# Patient Record
Sex: FEMALE | Race: WHITE | HISPANIC OR LATINO | URBAN - METROPOLITAN AREA
[De-identification: names, ages, dates, MRNs, and addresses within clinical notes are randomized per-mention and may not be internally consistent; named-entity substitution may affect disease eponyms.]

---
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
## 2021-05-18 ENCOUNTER — APPOINTMENT (OUTPATIENT)
Dept: FAMILY MEDICINE | Facility: CLINIC | Age: 21
End: 2021-05-18
Payer: COMMERCIAL

## 2021-05-18 VITALS
HEIGHT: 62 IN | WEIGHT: 150 LBS | BODY MASS INDEX: 27.6 KG/M2 | HEART RATE: 80 BPM | SYSTOLIC BLOOD PRESSURE: 118 MMHG | DIASTOLIC BLOOD PRESSURE: 70 MMHG | TEMPERATURE: 98.7 F | OXYGEN SATURATION: 99 %

## 2021-05-18 DIAGNOSIS — Z78.9 OTHER SPECIFIED HEALTH STATUS: ICD-10-CM

## 2021-05-18 DIAGNOSIS — Z13.21 ENCOUNTER FOR SCREENING FOR NUTRITIONAL DISORDER: ICD-10-CM

## 2021-05-18 DIAGNOSIS — Z23 ENCOUNTER FOR IMMUNIZATION: ICD-10-CM

## 2021-05-18 DIAGNOSIS — Z13.220 ENCOUNTER FOR SCREENING FOR LIPOID DISORDERS: ICD-10-CM

## 2021-05-18 DIAGNOSIS — Z13.1 ENCOUNTER FOR SCREENING FOR DIABETES MELLITUS: ICD-10-CM

## 2021-05-18 DIAGNOSIS — Z00.00 ENCOUNTER FOR GENERAL ADULT MEDICAL EXAMINATION W/OUT ABNORMAL FINDINGS: ICD-10-CM

## 2021-05-18 DIAGNOSIS — Z13.29 ENCOUNTER FOR SCREENING FOR OTHER SUSPECTED ENDOCRINE DISORDER: ICD-10-CM

## 2021-05-18 PROCEDURE — 99385 PREV VISIT NEW AGE 18-39: CPT | Mod: 25

## 2021-05-18 PROCEDURE — G0444 DEPRESSION SCREEN ANNUAL: CPT

## 2021-05-18 PROCEDURE — G0442 ANNUAL ALCOHOL SCREEN 15 MIN: CPT

## 2021-05-18 NOTE — ASSESSMENT
[FreeTextEntry1] : Annual physical: f/u routine labwork\par BMI 27: recommend low fat diet, wt loss, exercise, f/u lipid panel\par Rectal bleeding: intermittent for 3 months, no petar obvious bleeding at this time, no lumps/masses on SERA, FOBT positive, f/u labwork including CBC/iron studies, refer to colorectal sx\par RTC 1 wk

## 2021-05-18 NOTE — PHYSICAL EXAM
[Stool Occult Blood] : stool positive for occult blood [Normal] : affect was normal and insight and judgment were intact [FreeTextEntry1] : no lumps, masses noted with SERA

## 2021-05-18 NOTE — HISTORY OF PRESENT ILLNESS
[FreeTextEntry1] : NP/CPE\par  [de-identified] : 22 yo female presents for annual physical. Has complaint of intermittent rectal bleeding. Pt says she first had a bloody bowel movement in February after which she went to [facility] ER. She was discharged and told to follow up with GI. She did not follow up with GI since she did not have insurance at that time. Denies fever, chills, cp, palpitations, sob, nv, heat/cold intolerance, dizziness, muscle weakness, loss of sensation, bowel/bladder incontinence or calf pain.\par

## 2021-05-18 NOTE — REVIEW OF SYSTEMS
[Abdominal Pain] : no abdominal pain [Nausea] : no nausea [Constipation] : no constipation [Diarrhea] : diarrhea [Heartburn] : no heartburn [Vomiting] : no vomiting [Negative] : Psychiatric [FreeTextEntry7] : bright red blood per rectum

## 2021-05-18 NOTE — HEALTH RISK ASSESSMENT
[Good] : ~his/her~  mood as  good [] : No [Yes] : Yes [Monthly or less (1 pt)] : Monthly or less (1 point) [1 or 2 (0 pts)] : 1 or 2 (0 points) [Never (0 pts)] : Never (0 points) [No] : In the past 12 months have you used drugs other than those required for medical reasons? No [No falls in past year] : Patient reported no falls in the past year [0] : 2) Feeling down, depressed, or hopeless: Not at all (0) [Audit-CScore] : 1 [de-identified] : needs improvement [de-identified] : needs improvement [[id number]] : 0 [HIV test declined] : HIV test declined [Hepatitis C test declined] : Hepatitis C test declined [With Family] : lives with family [Employed] : employed [Significant Other] : lives with significant other [Sexually Active] : sexually active [High Risk Behavior] : no high risk behavior [Feels Safe at Home] : Feels safe at home [Fully functional (bathing, dressing, toileting, transferring, walking, feeding)] : Fully functional (bathing, dressing, toileting, transferring, walking, feeding) [Fully functional (using the telephone, shopping, preparing meals, housekeeping, doing laundry, using] : Fully functional and needs no help or supervision to perform IADLs (using the telephone, shopping, preparing meals, housekeeping, doing laundry, using transportation, managing medications and managing finances) [Reports changes in hearing] : Reports no changes in hearing [Reports changes in vision] : Reports no changes in vision [Reports changes in dental health] : Reports no changes in dental health

## 2021-05-21 ENCOUNTER — APPOINTMENT (OUTPATIENT)
Dept: COLORECTAL SURGERY | Facility: CLINIC | Age: 21
End: 2021-05-21
Payer: COMMERCIAL

## 2021-05-21 VITALS
DIASTOLIC BLOOD PRESSURE: 69 MMHG | WEIGHT: 150 LBS | RESPIRATION RATE: 14 BRPM | HEIGHT: 62 IN | BODY MASS INDEX: 27.6 KG/M2 | TEMPERATURE: 97.3 F | SYSTOLIC BLOOD PRESSURE: 109 MMHG | HEART RATE: 67 BPM

## 2021-05-21 DIAGNOSIS — Z87.19 PERSONAL HISTORY OF OTHER DISEASES OF THE DIGESTIVE SYSTEM: ICD-10-CM

## 2021-05-21 DIAGNOSIS — R19.4 CHANGE IN BOWEL HABIT: ICD-10-CM

## 2021-05-21 PROCEDURE — 99204 OFFICE O/P NEW MOD 45 MIN: CPT

## 2021-05-21 NOTE — PHYSICAL EXAM
[Posterior] : posteriorly [Skin Tags] : there were no residual hemorrhoidal skin tags seen [Tight] : was tight [No Rash or Lesion] : No rash or lesion [Alert] : alert [Oriented to Person] : oriented to person [Oriented to Place] : oriented to place [Oriented to Time] : oriented to time [Calm] : calm [de-identified] : No apparent distress [de-identified] : Normocephalic atraumatic [de-identified] : Moving all extremities x4

## 2021-05-21 NOTE — ASSESSMENT
[FreeTextEntry1] : Dorina presents to the office with a posterior anal fissure that is likely causing the reported anorectal pain and rectal bleeding.\par The cause of anal fissures, including hard and traumatic stools as well as diarrheal stools were discussed. Conservative management with the usage of creams, including diltiazem cream 2% t.i.d. and Analpram cream 2.5% t.i.d., to the fissure site for 4-6 weeks will be attempted in order to allow for healing without surgical intervention. In order to prevent worsening of the fissure symptoms, and/or recurrence, the following daily dietary recommendations were made : high fiber (25-30 g ), water 80 ounces, +/- MiraLax daily.  Sitz baths should also be after bowel movements and for comfort. If compliant with the above, over 80% of patients will heal with these measures . Improvement of symptoms can be seen 2 weeks after initiation of treatment. If the patient continues to have symptoms despite strict compliance to the above, a return visit will need to be scheduled. At that time, the need for botox injections vs sphincterotomy and fissurectomy will be reviewed and discussed.\par

## 2021-05-21 NOTE — HISTORY OF PRESENT ILLNESS
[FreeTextEntry1] : Mini presents to the office for consultation for a 3-month history of anorectal pain and rectal bleeding.  3 months earlier, she suffered a bout of gastroenteritis, and subsequently, has been noticing sharp stabbing knifelike pain after evacuating bowel movements.  Stools are admittedly hard and difficult to evacuate secondary to inadequate daily fiber intake though she admits to adequate water intake.  There is also noted to be hemorrhoidal burning and itching as well as blood being noted with wiping and sometimes dripping into the toilet bowl.

## 2021-05-22 ENCOUNTER — RX CHANGE (OUTPATIENT)
Age: 21
End: 2021-05-22

## 2021-08-18 ENCOUNTER — APPOINTMENT (OUTPATIENT)
Dept: COLORECTAL SURGERY | Facility: CLINIC | Age: 21
End: 2021-08-18
Payer: COMMERCIAL

## 2021-08-18 VITALS
BODY MASS INDEX: 28.89 KG/M2 | SYSTOLIC BLOOD PRESSURE: 104 MMHG | WEIGHT: 157 LBS | RESPIRATION RATE: 14 BRPM | DIASTOLIC BLOOD PRESSURE: 69 MMHG | HEIGHT: 62 IN | HEART RATE: 79 BPM | TEMPERATURE: 97.6 F

## 2021-08-18 PROCEDURE — 99214 OFFICE O/P EST MOD 30 MIN: CPT | Mod: 25

## 2021-08-18 PROCEDURE — 46600 DIAGNOSTIC ANOSCOPY SPX: CPT

## 2021-08-18 RX ORDER — HYDROCORTISONE ACETATE 25 MG/1
25 SUPPOSITORY RECTAL
Qty: 14 | Refills: 3 | Status: DISCONTINUED | COMMUNITY
Start: 2021-08-18 | End: 2021-08-18

## 2021-08-18 NOTE — HISTORY OF PRESENT ILLNESS
[FreeTextEntry1] : Mini presents to the office for consultation for a 3-month history of anorectal pain and rectal bleeding.  3 months earlier, she suffered a bout of gastroenteritis, and subsequently, has been noticing sharp stabbing knifelike pain after evacuating bowel movements.  Stools are admittedly hard and difficult to evacuate secondary to inadequate daily fiber intake though she admits to adequate water intake.  There is also noted to be hemorrhoidal burning and itching as well as blood being noted with wiping and sometimes dripping into the toilet bowl.\par \par 8/18/21 Mini returns to the office for follow-up.  Since her last office visit, she reports being compliant with a bowel regimen as well as the prescribed creams to treat her anal fissure.  As a consequence, she no longer experiences any issues with constipation and denies any pain with bowel movements.  Her main complaint today is of rectal bleeding.  This does not occur often, but recently was of a large amount and noted after a bowel movement, found in the toilet bowl and on the toilet paper.  At the time, she had associated hemorrhoidal burning and itching, all of which have resolved.

## 2021-08-18 NOTE — ASSESSMENT
[FreeTextEntry1] : Dorina presents to the office with a posterior anal fissure that is likely causing the reported anorectal pain and rectal bleeding.\par The cause of anal fissures, including hard and traumatic stools as well as diarrheal stools were discussed. Conservative management with the usage of creams, including diltiazem cream 2% t.i.d. and Analpram cream 2.5% t.i.d., to the fissure site for 4-6 weeks will be attempted in order to allow for healing without surgical intervention. In order to prevent worsening of the fissure symptoms, and/or recurrence, the following daily dietary recommendations were made : high fiber (25-30 g ), water 80 ounces, +/- MiraLax daily.  Sitz baths should also be after bowel movements and for comfort. If compliant with the above, over 80% of patients will heal with these measures . Improvement of symptoms can be seen 2 weeks after initiation of treatment. If the patient continues to have symptoms despite strict compliance to the above, a return visit will need to be scheduled. At that time, the need for botox injections vs sphincterotomy and fissurectomy will be reviewed and discussed.\par \par \par 8/18/21 Mini returns to the office for a followup visit. Her issues with constipation and anal fissure appeared to have improved. Despite this,she reports a recent episode in which she had a considerable amount of blood passed after her bowel movements with the blood noted on the toilet paper as well as in the toilet bowl. At that time she also had associated hemorrhoidal burning and itching.  Based on the constellation of symptoms, I suspect she had a flare of her internal hemorrhoids.  I repeated the importance of maintaining a healthy bowel regimen to prevent these episodes. At this point, she can discontinue the diltiazem cream and continue with hydrocortisone cream only. I have also offered her the option of hydrocortisone suppositories which may be more effective In addressing hemorrhoidal inflammation. If she notices ongoing or worsening rectal bleeding despite the healthy bowel regimen and hydrocortisone suppositories, she is to notify us and return to the office as we will likely proceed with scheduling for colonoscopy. She understands, and is agreeable.

## 2021-08-18 NOTE — PHYSICAL EXAM
[Normal rectal exam] : exam was normal [Posterior] : posteriorly [Reduce Spontaneously] : a spontaneously reducible (grade II) [Normal] : was normal [None] : there was no rectal mass  [No Rash or Lesion] : No rash or lesion [Alert] : alert [Oriented to Person] : oriented to person [Oriented to Place] : oriented to place [Oriented to Time] : oriented to time [Calm] : calm [Skin Tags] : there were no residual hemorrhoidal skin tags seen [Gross Blood] : no gross blood [de-identified] : No apparent distress [de-identified] : Normocephalic atraumatic [de-identified] : Moving all extremities x4

## 2021-09-01 ENCOUNTER — APPOINTMENT (OUTPATIENT)
Dept: FAMILY MEDICINE | Facility: CLINIC | Age: 21
End: 2021-09-01

## 2021-10-28 ENCOUNTER — APPOINTMENT (OUTPATIENT)
Dept: FAMILY MEDICINE | Facility: CLINIC | Age: 21
End: 2021-10-28
Payer: COMMERCIAL

## 2021-10-28 VITALS
OXYGEN SATURATION: 98 % | HEART RATE: 85 BPM | WEIGHT: 152 LBS | HEIGHT: 62 IN | SYSTOLIC BLOOD PRESSURE: 118 MMHG | TEMPERATURE: 98.4 F | DIASTOLIC BLOOD PRESSURE: 78 MMHG | BODY MASS INDEX: 27.97 KG/M2

## 2021-10-28 PROCEDURE — 99213 OFFICE O/P EST LOW 20 MIN: CPT | Mod: 25

## 2021-10-28 PROCEDURE — 81003 URINALYSIS AUTO W/O SCOPE: CPT | Mod: QW

## 2021-10-28 RX ORDER — HYDROCORTISONE 25 MG/G
2.5 CREAM TOPICAL
Qty: 30 | Refills: 3 | Status: DISCONTINUED | COMMUNITY
Start: 2021-05-21 | End: 2021-10-28

## 2021-10-28 RX ORDER — HYDROCORTISONE ACETATE 25 MG/1
25 SUPPOSITORY RECTAL
Qty: 14 | Refills: 3 | Status: DISCONTINUED | COMMUNITY
Start: 2021-08-18 | End: 2021-10-28

## 2021-10-28 NOTE — HISTORY OF PRESENT ILLNESS
[FreeTextEntry1] : follow up [de-identified] : 22 yo female presents for follow up. Pt says she has not had a chance to do bloodwork yet. She has seen Dr. Belcher for rectal bleeding, found to have anal fissure. She was given medications including diltiazem cream, analpram cream. She is feeling much better. Bleeding has resolved. Denies fever, chills, cp, palpitations, sob, nv, heat/cold intolerance, dizziness, melena, hematochezia, muscle weakness, loss of sensation, bowel/bladder incontinence or calf pain.\par

## 2021-10-28 NOTE — HISTORY OF PRESENT ILLNESS
[FreeTextEntry1] : follow up [de-identified] : 20 yo female presents for follow up. Pt says she has not had a chance to do bloodwork yet. She has seen Dr. Belcher for rectal bleeding, found to have anal fissure. She was given medications including diltiazem cream, analpram cream. She is feeling much better. Bleeding has resolved. Denies fever, chills, cp, palpitations, sob, nv, heat/cold intolerance, dizziness, melena, hematochezia, muscle weakness, loss of sensation, bowel/bladder incontinence or calf pain.\par

## 2021-10-28 NOTE — ASSESSMENT
[FreeTextEntry1] : Anal fissure: sees colorectal sx, s/p diltiazem cream, analpram cream, sitz bath, symptoms resolved, no bleeding episodes, recommend increased fiber diet and hydration, f/u colorectal sx\par BMI 27: recommend low fat diet, wt loss, exercise\par RTC 3 wks

## 2021-11-03 DIAGNOSIS — E55.9 VITAMIN D DEFICIENCY, UNSPECIFIED: ICD-10-CM

## 2021-11-03 LAB
25(OH)D3 SERPL-MCNC: 19.8 NG/ML
ALBUMIN SERPL ELPH-MCNC: 4.9 G/DL
ALP BLD-CCNC: 80 U/L
ALT SERPL-CCNC: 13 U/L
ANION GAP SERPL CALC-SCNC: 13 MMOL/L
APPEARANCE: CLEAR
AST SERPL-CCNC: 15 U/L
BACTERIA: NEGATIVE
BASOPHILS # BLD AUTO: 0.04 K/UL
BASOPHILS NFR BLD AUTO: 0.5 %
BILIRUB SERPL-MCNC: 0.7 MG/DL
BILIRUBIN URINE: NEGATIVE
BLOOD URINE: NEGATIVE
BUN SERPL-MCNC: 14 MG/DL
CALCIUM SERPL-MCNC: 9.8 MG/DL
CHLORIDE SERPL-SCNC: 103 MMOL/L
CHOLEST SERPL-MCNC: 148 MG/DL
CO2 SERPL-SCNC: 21 MMOL/L
COLOR: NORMAL
CREAT SERPL-MCNC: 0.76 MG/DL
EOSINOPHIL # BLD AUTO: 0.28 K/UL
EOSINOPHIL NFR BLD AUTO: 3.6 %
ESTIMATED AVERAGE GLUCOSE: 100 MG/DL
FERRITIN SERPL-MCNC: 51 NG/ML
FOLATE SERPL-MCNC: 17.2 NG/ML
GLUCOSE QUALITATIVE U: NEGATIVE
GLUCOSE SERPL-MCNC: 81 MG/DL
HBA1C MFR BLD HPLC: 5.1 %
HCT VFR BLD CALC: 40.5 %
HDLC SERPL-MCNC: 62 MG/DL
HGB BLD-MCNC: 13.5 G/DL
HYALINE CASTS: 1 /LPF
IMM GRANULOCYTES NFR BLD AUTO: 0.3 %
IRON SATN MFR SERPL: 21 %
IRON SERPL-MCNC: 72 UG/DL
KETONES URINE: NEGATIVE
LDLC SERPL CALC-MCNC: 70 MG/DL
LEUKOCYTE ESTERASE URINE: NEGATIVE
LYMPHOCYTES # BLD AUTO: 2.44 K/UL
LYMPHOCYTES NFR BLD AUTO: 31.5 %
MAN DIFF?: NORMAL
MCHC RBC-ENTMCNC: 30 PG
MCHC RBC-ENTMCNC: 33.3 GM/DL
MCV RBC AUTO: 90 FL
MICROSCOPIC-UA: NORMAL
MONOCYTES # BLD AUTO: 0.52 K/UL
MONOCYTES NFR BLD AUTO: 6.7 %
NEUTROPHILS # BLD AUTO: 4.45 K/UL
NEUTROPHILS NFR BLD AUTO: 57.4 %
NITRITE URINE: NEGATIVE
NONHDLC SERPL-MCNC: 86 MG/DL
PH URINE: 6
PLATELET # BLD AUTO: 252 K/UL
POTASSIUM SERPL-SCNC: 4.5 MMOL/L
PROT SERPL-MCNC: 7.4 G/DL
PROTEIN URINE: NEGATIVE
RBC # BLD: 4.5 M/UL
RBC # FLD: 13.3 %
RED BLOOD CELLS URINE: 2 /HPF
SODIUM SERPL-SCNC: 136 MMOL/L
SPECIFIC GRAVITY URINE: 1.01
SQUAMOUS EPITHELIAL CELLS: 3 /HPF
TIBC SERPL-MCNC: 339 UG/DL
TRANSFERRIN SERPL-MCNC: 280 MG/DL
TRIGL SERPL-MCNC: 76 MG/DL
TSH SERPL-ACNC: 0.92 UIU/ML
UIBC SERPL-MCNC: 266 UG/DL
UROBILINOGEN URINE: NORMAL
VIT B12 SERPL-MCNC: 567 PG/ML
WBC # FLD AUTO: 7.75 K/UL
WHITE BLOOD CELLS URINE: 1 /HPF

## 2022-01-14 ENCOUNTER — APPOINTMENT (OUTPATIENT)
Dept: FAMILY MEDICINE | Facility: CLINIC | Age: 22
End: 2022-01-14

## 2022-08-16 ENCOUNTER — APPOINTMENT (OUTPATIENT)
Dept: FAMILY MEDICINE | Facility: CLINIC | Age: 22
End: 2022-08-16

## 2022-08-16 VITALS
BODY MASS INDEX: 26.87 KG/M2 | HEIGHT: 62 IN | SYSTOLIC BLOOD PRESSURE: 118 MMHG | TEMPERATURE: 98.6 F | DIASTOLIC BLOOD PRESSURE: 72 MMHG | HEART RATE: 78 BPM | OXYGEN SATURATION: 99 % | WEIGHT: 146 LBS

## 2022-08-16 DIAGNOSIS — G43.909 MIGRAINE, UNSPECIFIED, NOT INTRACTABLE, W/OUT STATUS MIGRAINOSUS: ICD-10-CM

## 2022-08-16 DIAGNOSIS — R53.83 OTHER FATIGUE: ICD-10-CM

## 2022-08-16 DIAGNOSIS — R51.9 HEADACHE, UNSPECIFIED: ICD-10-CM

## 2022-08-16 PROCEDURE — 99214 OFFICE O/P EST MOD 30 MIN: CPT | Mod: 25

## 2022-08-16 PROCEDURE — 36415 COLL VENOUS BLD VENIPUNCTURE: CPT

## 2022-08-16 RX ORDER — COLD-HOT PACK
125 MCG EACH MISCELLANEOUS
Qty: 90 | Refills: 0 | Status: DISCONTINUED | COMMUNITY
Start: 2021-11-03 | End: 2022-08-16

## 2022-08-16 RX ORDER — ELETRIPTAN HYDROBROMIDE 20 MG/1
20 TABLET, FILM COATED ORAL
Qty: 20 | Refills: 0 | Status: ACTIVE | COMMUNITY
Start: 2022-08-16 | End: 1900-01-01

## 2022-08-16 NOTE — HISTORY OF PRESENT ILLNESS
[FreeTextEntry8] : 21 yo female presents with complaint of frequent headaches, she says they have been getting worse over the past few months, headaches, 9/10 in severity at its worst, vary, sharp, occurs a few times per week, front of head, sometimes posterior, associated with photophobia, reports having to go to dark room or wear sunglasses. No aura. Gets some relief with tylenol or ibuprofen. Denies fever, chills, cp, palpitations, sob, nv, heat/cold intolerance, dizziness, melena, hematochezia, muscle weakness, loss of sensation, bowel/bladder incontinence or calf pain.\par

## 2022-08-16 NOTE — REVIEW OF SYSTEMS
[Headache] : headache [Dizziness] : no dizziness [Fainting] : no fainting [Confusion] : no confusion [Memory Loss] : no memory loss [Unsteady Walking] : no ataxia [Negative] : Psychiatric

## 2022-09-22 ENCOUNTER — APPOINTMENT (OUTPATIENT)
Dept: FAMILY MEDICINE | Facility: CLINIC | Age: 22
End: 2022-09-22

## 2023-07-21 ENCOUNTER — OUTPATIENT (OUTPATIENT)
Dept: INPATIENT UNIT | Facility: HOSPITAL | Age: 23
LOS: 1 days | End: 2023-07-21
Payer: COMMERCIAL

## 2023-07-21 ENCOUNTER — OUTPATIENT (OUTPATIENT)
Dept: OUTPATIENT SERVICES | Facility: HOSPITAL | Age: 23
LOS: 1 days | End: 2023-07-21
Payer: COMMERCIAL

## 2023-07-21 VITALS
HEART RATE: 85 BPM | SYSTOLIC BLOOD PRESSURE: 121 MMHG | OXYGEN SATURATION: 100 % | RESPIRATION RATE: 18 BRPM | TEMPERATURE: 97 F | DIASTOLIC BLOOD PRESSURE: 82 MMHG

## 2023-07-21 VITALS — DIASTOLIC BLOOD PRESSURE: 82 MMHG | SYSTOLIC BLOOD PRESSURE: 121 MMHG | HEART RATE: 85 BPM

## 2023-07-21 VITALS — HEART RATE: 70 BPM | SYSTOLIC BLOOD PRESSURE: 112 MMHG | DIASTOLIC BLOOD PRESSURE: 75 MMHG

## 2023-07-21 VITALS — SYSTOLIC BLOOD PRESSURE: 110 MMHG | DIASTOLIC BLOOD PRESSURE: 69 MMHG | HEART RATE: 82 BPM

## 2023-07-21 DIAGNOSIS — O26.899 OTHER SPECIFIED PREGNANCY RELATED CONDITIONS, UNSPECIFIED TRIMESTER: ICD-10-CM

## 2023-07-21 PROCEDURE — 59025 FETAL NON-STRESS TEST: CPT

## 2023-07-21 PROCEDURE — 59025 FETAL NON-STRESS TEST: CPT | Mod: 26

## 2023-07-21 PROCEDURE — G0463: CPT

## 2023-07-21 PROCEDURE — 99221 1ST HOSP IP/OBS SF/LOW 40: CPT | Mod: 25,GC

## 2023-07-21 NOTE — OB PROVIDER TRIAGE NOTE - NSHPPHYSICALEXAM_GEN_ALL_CORE
T(C): 36.8 (07-21-23 @ 06:35), Max: 36.8 (07-21-23 @ 06:35)  HR: 86 (07-21-23 @ 06:35) (82 - 86)  BP: 110/69 (07-21-23 @ 06:35) (110/69 - 110/69)  RR: 17 (07-21-23 @ 06:35) (17 - 17)      Gen: NAD, well-appearing, AAOx3   Abd: Soft, gravid  Ext: non-tender, non-edematous  SSE:   SVE:    Bedside sono: vertex   FHT: baseline 135, moderate variability, + acels, - decels   [place]: ana rosa infrequently T(C): 36.8 (07-21-23 @ 06:35), Max: 36.8 (07-21-23 @ 06:35)  HR: 86 (07-21-23 @ 06:35) (82 - 86)  BP: 110/69 (07-21-23 @ 06:35) (110/69 - 110/69)  RR: 17 (07-21-23 @ 06:35) (17 - 17)      Gen: NAD, well-appearing, AAOx3   Abd: Soft, gravid  Ext: non-tender, non-edematous  SSE: deferred  SVE:  1/70/-2  Bedside sono: vertex   FHT: baseline 135, moderate variability, + acels, - decels   [place]: ana rosa infrequently

## 2023-07-21 NOTE — OB PROVIDER TRIAGE NOTE - NSOBPROVIDERNOTE_OBGYN_ALL_OB_FT
A/P: MANUELITO STOUT is a 23y  at 39w3d GA who presents to L&D for painful ctx. Cat I FHT.  -d/c home    Dispo: Continue to observe.     Discussed with Dr. Evans A/P: MANUELITO STOUT is a 23y  at 39w3d GA who presents to L&D for painful ctx. Rule-out labor. Cat I FHT.  - cervical exam unchanged from a few hours ago  - Patient wants to go home. Strict return precautions given.     Dispo: home    Discussed with Dr. Evans

## 2023-07-21 NOTE — OB PROVIDER TRIAGE NOTE - NSOBPROVIDERNOTE_OBGYN_ALL_OB_FT
A/P: Patient is a 23y  at 39w3d presenting with regular, painful contractions since this morning.     -  -Labor: Intact membranes. Sarah irregularly.   -Fetus: Cat I tracing. Continuous toco and fetal monitoring.   -GBS:   -Analgesia:     Discussed with Dr. Beti Robbins, Ms4 A/P: Patient is a 23y  at 39w3d presenting with regular, painful contractions since this morning.     -NST reactive  -Labor: Intact membranes. Sarah irregularly.   -Fetus: Cat I tracing. Continuous toco and fetal monitoring.   -GBS:   -Analgesia:     Seen and examined with Dr. Geovanni Robbins, Ms4

## 2023-07-21 NOTE — OB PROVIDER TRIAGE NOTE - NSICDXNOPASTMEDICALHX_GEN_ALL_CORE
[facility] <-- Click to add NO pertinent Past Medical History

## 2023-07-21 NOTE — OB PROVIDER TRIAGE NOTE - HISTORY OF PRESENT ILLNESS
MANUELITO STOUT is a 23y  at 39w3d GA who presents to L&D for painful ctx. LMP 10/18/22, SHERIE 22, EFW 2750g as of two weeks ago. She states that they are sharp in her lower abdomen and back. She also states that they are becoming less and less frequent.     Pt denies vaginal bleeding and leakage of fluid. She endorses good fetal movement.     Pt denies headaches, visual disturbances, RUQ pain, epigastric pain and new-onset edema.     She denies any urinary complaints.     She denies fevers, chills, nausea, vomiting.     She denies shortness of breath, chest pain, and palpitations.    Pregnancy course is uncomplicated.     POB: Denies  PGYN: -fibroids/-cysts, denied STD hx, denies abnormal PAPs  PMH: Denies  PSH: Denies  SH: Denies tobacco use, EtOH use and illicit drug use during the pregnancy; Feels safe at home  Meds: PNVs  All: NKDA

## 2023-07-21 NOTE — OB PROVIDER TRIAGE NOTE - ATTENDING COMMENTS
23y  at 39w3d GA who presents to L&D for painful ctx. LMP 10/18/22, SHERIE 23, EFW 2750g as of two weeks ago. She states that they are sharp in her lower abdomen and back. She also states that they are becoming less and less frequent. Pt denies vaginal bleeding and leakage of fluid. She endorses good fetal movement.  HR: 85 (23 @ 19:53) (70 - 86)  BP: 121/82 (23 @ 19:53) (110/69 - 121/82)  FHT - reactive  [place] - irregular contractions  SVE- 1-2/70/-2  A/P 25 y/o  @ 39 weeks in early latent labor   - maternal and fetal status reassuring   - reviewed options with patient - latent labor, Pt prefers to go home at this time   - precautions were discussed   - if pt not delivered over the weekend will make appt for next week    Latoya Evans MD

## 2023-07-21 NOTE — OB PROVIDER TRIAGE NOTE - NSHPPHYSICALEXAM_GEN_ALL_CORE
T(C): 36.2 (07-21-23 @ 19:49), Max: 36.8 (07-21-23 @ 06:35)  HR: 85 (07-21-23 @ 19:53) (70 - 86)  BP: 121/82 (07-21-23 @ 19:53) (110/69 - 121/82)  RR: 18 (07-21-23 @ 19:49) (17 - 18)  SpO2: 100% (07-21-23 @ 19:49) (100% - 100%)  Gen: NAD, well-appearing  Abd: soft, gravid  SVE: 2, 70, -2    FHT:  baseline, +accels, -decels, moderate variability  [place]: irregular contractions T(C): 36.2 (07-21-23 @ 19:49), Max: 36.8 (07-21-23 @ 06:35)  HR: 85 (07-21-23 @ 19:53) (70 - 86)  BP: 121/82 (07-21-23 @ 19:53) (110/69 - 121/82)  RR: 18 (07-21-23 @ 19:49) (17 - 18)  SpO2: 100% (07-21-23 @ 19:49) (100% - 100%)    Gen: NAD, well-appearing  Abd: soft, gravid  SVE: 1.5, 70, -2     FHT:  baseline, +accels, -decels, moderate variability  [place]: irregular contractions

## 2023-07-21 NOTE — OB PROVIDER TRIAGE NOTE - HISTORY OF PRESENT ILLNESS
23y  at 39w3d GA who presents to L&D for painful ocntractions. Patient states that the contractions began at 2:30 AM were sharp, intermittent pain in her abdomen that radiates to her back along with tightening of the belly. She reports the pain a 7/10 and states that these are much more intense than contractions she had felt previously. They were occurring every 10 minutes initially, but have slowed since she arrived in triage. Patient denies current vaginal bleeding, but experienced very slight bleeding on her underwear Tuesday. She denies any leakage of fluid. She endorses good fetal movement. Denies fevers, chills, nausea, vomiting, chest pain, SOB, dizziness and headache. No other complaints at this time. Patient follows with [facility] and was meant to go to the office today to check for cervical dilation.      SHERIE: 22  LMP: 10/18/22    Prenatal course uncomplicated.      Obhx: ; course uncomplicated thus far  Gynhx: -fibroids, -ovarian cysts, denies STD hx, denies abnormal PAPs   PMH: Denies  PSH: Denies  Soc hx: Denies EtOH, tobacco and illicit drug use during this pregnancy  Anx/dep:  Denies  Meds: PNV  Allergies: NKDA  GBS: patient cannot recall  EFW: pt believes baby was measuring 2750 g 2 weeks ago

## 2023-07-21 NOTE — OB RN TRIAGE NOTE - FALL HARM RISK - RISK INTERVENTIONS

## 2023-07-22 PROBLEM — Z78.9 OTHER SPECIFIED HEALTH STATUS: Chronic | Status: ACTIVE | Noted: 2023-07-21

## 2023-07-23 DIAGNOSIS — O47.1 FALSE LABOR AT OR AFTER 37 COMPLETED WEEKS OF GESTATION: ICD-10-CM

## 2023-07-23 DIAGNOSIS — Z3A.39 39 WEEKS GESTATION OF PREGNANCY: ICD-10-CM

## 2023-08-04 DIAGNOSIS — O47.1 FALSE LABOR AT OR AFTER 37 COMPLETED WEEKS OF GESTATION: ICD-10-CM

## 2023-08-04 DIAGNOSIS — Z3A.39 39 WEEKS GESTATION OF PREGNANCY: ICD-10-CM

## 2023-09-06 ENCOUNTER — APPOINTMENT (OUTPATIENT)
Dept: COLORECTAL SURGERY | Facility: CLINIC | Age: 23
End: 2023-09-06
Payer: COMMERCIAL

## 2023-09-06 DIAGNOSIS — K62.5 HEMORRHAGE OF ANUS AND RECTUM: ICD-10-CM

## 2023-09-06 DIAGNOSIS — K62.89 OTHER SPECIFIED DISEASES OF ANUS AND RECTUM: ICD-10-CM

## 2023-09-06 DIAGNOSIS — K60.2 ANAL FISSURE, UNSPECIFIED: ICD-10-CM

## 2023-09-06 PROCEDURE — 99214 OFFICE O/P EST MOD 30 MIN: CPT

## 2023-09-06 RX ORDER — POLYETHYLENE GLYCOL 3350 17 G/17G
17 POWDER, FOR SOLUTION ORAL
Qty: 1 | Refills: 0 | Status: ACTIVE | COMMUNITY
Start: 2023-09-06 | End: 1900-01-01

## 2023-09-06 RX ORDER — HYDROCORTISONE 25 MG/G
2.5 CREAM TOPICAL
Qty: 1 | Refills: 0 | Status: ACTIVE | COMMUNITY
Start: 2023-09-06 | End: 1900-01-01

## 2023-09-06 NOTE — PHYSICAL EXAM
[Normal rectal exam] : exam was normal [Anterior] : anteriorly [Posterior] : posteriorly [Reduce Spontaneously] : a spontaneously reducible (grade II) [Skin Tags] : there were no residual hemorrhoidal skin tags seen [Normal] : was normal [None] : there was no rectal mass  [Gross Blood] : no gross blood [No Rash or Lesion] : No rash or lesion [Alert] : alert [Oriented to Person] : oriented to person [Oriented to Place] : oriented to place [Oriented to Time] : oriented to time [Calm] : calm [de-identified] : Anterior > posterior [de-identified] : No apparent distress [de-identified] : Normocephalic atraumatic [de-identified] : Moving all extremities x4

## 2023-09-06 NOTE — HISTORY OF PRESENT ILLNESS
[FreeTextEntry1] : Mini presents to the office for consultation for a 3-month history of anorectal pain and rectal bleeding.  3 months earlier, she suffered a bout of gastroenteritis, and subsequently, has been noticing sharp stabbing knifelike pain after evacuating bowel movements.  Stools are admittedly hard and difficult to evacuate secondary to inadequate daily fiber intake though she admits to adequate water intake.  There is also noted to be hemorrhoidal burning and itching as well as blood being noted with wiping and sometimes dripping into the toilet bowl.  8/18/21 Mini returns to the office for follow-up.  Since her last office visit, she reports being compliant with a bowel regimen as well as the prescribed creams to treat her anal fissure.  As a consequence, she no longer experiences any issues with constipation and denies any pain with bowel movements.  Her main complaint today is of rectal bleeding.  This does not occur often, but recently was of a large amount and noted after a bowel movement, found in the toilet bowl and on the toilet paper.  At the time, she had associated hemorrhoidal burning and itching, all of which have resolved.  9/6/23 Ms. Skelton presents to the office for followup.  Approximately 1 month earlier, she gave birth to her first child.  Since that time, she has had anorectal pain with defecation as well as rectal bleeding.  She denies significant constipation and passes her bowel movements daily. She is not breast feeding currently.  Here for further evaluation and treatment.

## 2023-09-06 NOTE — ASSESSMENT
[FreeTextEntry1] : Dorina presents to the office with a posterior anal fissure that is likely causing the reported anorectal pain and rectal bleeding. The cause of anal fissures, including hard and traumatic stools as well as diarrheal stools were discussed. Conservative management with the usage of creams, including diltiazem cream 2% t.i.d. and Analpram cream 2.5% t.i.d., to the fissure site for 4-6 weeks will be attempted in order to allow for healing without surgical intervention. In order to prevent worsening of the fissure symptoms, and/or recurrence, the following daily dietary recommendations were made : high fiber (25-30 g ), water 80 ounces, +/- MiraLax daily.  Sitz baths should also be after bowel movements and for comfort. If compliant with the above, over 80% of patients will heal with these measures . Improvement of symptoms can be seen 2 weeks after initiation of treatment. If the patient continues to have symptoms despite strict compliance to the above, a return visit will need to be scheduled. At that time, the need for botox injections vs sphincterotomy and fissurectomy will be reviewed and discussed.  8/18/21 Mini returns to the office for a followup visit. Her issues with constipation and anal fissure appeared to have improved. Despite this,she reports a recent episode in which she had a considerable amount of blood passed after her bowel movements with the blood noted on the toilet paper as well as in the toilet bowl. At that time she also had associated hemorrhoidal burning and itching.  Based on the constellation of symptoms, I suspect she had a flare of her internal hemorrhoids.  I repeated the importance of maintaining a healthy bowel regimen to prevent these episodes. At this point, she can discontinue the diltiazem cream and continue with hydrocortisone cream only. I have also offered her the option of hydrocortisone suppositories which may be more effective In addressing hemorrhoidal inflammation. If she notices ongoing or worsening rectal bleeding despite the healthy bowel regimen and hydrocortisone suppositories, she is to notify us and return to the office as we will likely proceed with scheduling for colonoscopy. She understands, and is agreeable.  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 Anterior > posterior in size and depth.  The cause of anal fissures, including hard and traumatic stools as well as diarrheal stools were discussed. Conservative management with the usage of creams, including diltiazem cream 2% t.i.d. and Analpram cream 2.5% t.i.d., to the fissure site for 4-6 weeks will be attempted in order to allow for healing without surgical intervention. In order to prevent worsening of the fissure symptoms, and/or recurrence, the following daily dietary recommendations were made : high fiber (25-30 g ), water 80 ounces, +/- MiraLax daily.  Sitz baths should also be after bowel movements and for comfort. If compliant with the above, over 80% of patients will heal with these measures . Improvement of symptoms can be seen 2 weeks after initiation of treatment. If the patient continues to have symptoms despite strict compliance to the above, a return visit will need to be scheduled. At that time, the need for botox injections vs sphincterotomy and fissurectomy will be reviewed and discussed.

## 2023-09-12 VITALS
WEIGHT: 166 LBS | SYSTOLIC BLOOD PRESSURE: 102 MMHG | BODY MASS INDEX: 30.55 KG/M2 | HEIGHT: 62 IN | HEART RATE: 75 BPM | RESPIRATION RATE: 14 BRPM | DIASTOLIC BLOOD PRESSURE: 68 MMHG

## 2025-06-04 PROBLEM — Z00.00 ENCOUNTER FOR PREVENTIVE HEALTH EXAMINATION: Status: ACTIVE | Noted: 2025-06-04

## 2025-06-13 ENCOUNTER — APPOINTMENT (OUTPATIENT)
Dept: COLORECTAL SURGERY | Facility: CLINIC | Age: 25
End: 2025-06-13

## 2025-06-25 ENCOUNTER — APPOINTMENT (OUTPATIENT)
Dept: FAMILY MEDICINE | Facility: CLINIC | Age: 25
End: 2025-06-25

## 2025-06-25 VITALS
WEIGHT: 161 LBS | BODY MASS INDEX: 29.63 KG/M2 | DIASTOLIC BLOOD PRESSURE: 62 MMHG | SYSTOLIC BLOOD PRESSURE: 100 MMHG | HEART RATE: 70 BPM | HEIGHT: 62 IN | OXYGEN SATURATION: 98 %